# Patient Record
Sex: MALE | Race: BLACK OR AFRICAN AMERICAN | NOT HISPANIC OR LATINO | Employment: UNEMPLOYED | URBAN - METROPOLITAN AREA
[De-identification: names, ages, dates, MRNs, and addresses within clinical notes are randomized per-mention and may not be internally consistent; named-entity substitution may affect disease eponyms.]

---

## 2017-11-08 ENCOUNTER — HOSPITAL ENCOUNTER (EMERGENCY)
Age: 32
Discharge: HOME | End: 2017-11-08
Attending: EMERGENCY MEDICINE

## 2017-11-08 VITALS
HEIGHT: 75 IN | BODY MASS INDEX: 39.17 KG/M2 | WEIGHT: 315 LBS | OXYGEN SATURATION: 99 % | HEART RATE: 89 BPM | SYSTOLIC BLOOD PRESSURE: 134 MMHG | TEMPERATURE: 98.3 F | RESPIRATION RATE: 22 BRPM | DIASTOLIC BLOOD PRESSURE: 100 MMHG

## 2017-11-08 DIAGNOSIS — M54.50 CHRONIC RIGHT-SIDED LOW BACK PAIN WITHOUT SCIATICA: Primary | ICD-10-CM

## 2017-11-08 DIAGNOSIS — G89.29 CHRONIC RIGHT-SIDED LOW BACK PAIN WITHOUT SCIATICA: Primary | ICD-10-CM

## 2017-11-08 LAB
APPEARANCE UR: CLEAR
BILIRUB UR QL STRIP: NEGATIVE
COLOR UR: YELLOW
GLUCOSE UR STRIP-MCNC: NEGATIVE MG/DL
HGB UR QL STRIP: NEGATIVE
KETONES UR STRIP-MCNC: NEGATIVE MG/DL
LEUKOCYTE ESTERASE UR QL STRIP: NEGATIVE
NITRITE UR QL STRIP: NEGATIVE
PH UR STRIP: 5.5 UNITS (ref 5–7)
PROT UR STRIP-MCNC: 30 MG/DL
SP GR UR STRIP: >1.03 (ref 1–1.03)
UROBILINOGEN UR STRIP-MCNC: 0.2 MG/DL (ref 0–1)

## 2017-11-08 PROCEDURE — 99282 EMERGENCY DEPT VISIT SF MDM: CPT | Performed by: PHYSICIAN ASSISTANT

## 2017-11-08 PROCEDURE — 81003 URINALYSIS AUTO W/O SCOPE: CPT

## 2017-11-08 PROCEDURE — 99283 EMERGENCY DEPT VISIT LOW MDM: CPT

## 2017-11-08 RX ORDER — ACETAMINOPHEN 325 MG/1
650 TABLET ORAL ONCE
Status: DISCONTINUED | OUTPATIENT
Start: 2017-11-08 | End: 2017-11-08 | Stop reason: HOSPADM

## 2017-11-08 ASSESSMENT — PAIN SCALES - GENERAL
PAINLEVEL_OUTOF10: 10
PAINLEVEL_OUTOF10: 10

## 2019-10-07 ENCOUNTER — HOSPITAL ENCOUNTER (EMERGENCY)
Facility: CLINIC | Age: 34
Discharge: HOME | End: 2019-10-07
Attending: PHYSICIAN ASSISTANT
Payer: MEDICAID

## 2019-10-07 ENCOUNTER — HOSPITAL ENCOUNTER (EMERGENCY)
Facility: CLINIC | Age: 34
Discharge: HOME | End: 2019-10-07
Attending: EMERGENCY MEDICINE | Admitting: EMERGENCY MEDICINE
Payer: MEDICAID

## 2019-10-07 VITALS
TEMPERATURE: 97.1 F | SYSTOLIC BLOOD PRESSURE: 165 MMHG | OXYGEN SATURATION: 98 % | BODY MASS INDEX: 39.17 KG/M2 | WEIGHT: 315 LBS | RESPIRATION RATE: 20 BRPM | DIASTOLIC BLOOD PRESSURE: 104 MMHG | HEIGHT: 75 IN

## 2019-10-07 VITALS
HEART RATE: 100 BPM | RESPIRATION RATE: 18 BRPM | DIASTOLIC BLOOD PRESSURE: 108 MMHG | TEMPERATURE: 97.3 F | SYSTOLIC BLOOD PRESSURE: 142 MMHG | OXYGEN SATURATION: 96 %

## 2019-10-07 DIAGNOSIS — K08.89 PAIN, DENTAL: ICD-10-CM

## 2019-10-07 DIAGNOSIS — R68.84 JAW PAIN: ICD-10-CM

## 2019-10-07 PROCEDURE — 99282 EMERGENCY DEPT VISIT SF MDM: CPT | Mod: 27

## 2019-10-07 PROCEDURE — 25000132 ZZH RX MED GY IP 250 OP 250 PS 637: Performed by: PHYSICIAN ASSISTANT

## 2019-10-07 PROCEDURE — 99283 EMERGENCY DEPT VISIT LOW MDM: CPT

## 2019-10-07 RX ORDER — LIDOCAINE HYDROCHLORIDE AND EPINEPHRINE 10; 10 MG/ML; UG/ML
5 INJECTION, SOLUTION INFILTRATION; PERINEURAL ONCE
Status: DISCONTINUED | OUTPATIENT
Start: 2019-10-07 | End: 2019-10-07 | Stop reason: HOSPADM

## 2019-10-07 RX ORDER — IBUPROFEN 600 MG/1
600 TABLET, FILM COATED ORAL ONCE
Status: DISCONTINUED | OUTPATIENT
Start: 2019-10-07 | End: 2019-10-07 | Stop reason: HOSPADM

## 2019-10-07 RX ORDER — PENICILLIN V POTASSIUM 500 MG/1
500 TABLET, FILM COATED ORAL 4 TIMES DAILY
Qty: 28 TABLET | Refills: 0 | Status: SHIPPED | OUTPATIENT
Start: 2019-10-07

## 2019-10-07 RX ORDER — PENICILLIN V POTASSIUM 500 MG/1
500 TABLET, FILM COATED ORAL 4 TIMES DAILY
Qty: 28 TABLET | Refills: 0 | Status: SHIPPED | OUTPATIENT
Start: 2019-10-07 | End: 2019-10-07

## 2019-10-07 RX ORDER — IBUPROFEN 600 MG/1
600 TABLET, FILM COATED ORAL ONCE
Status: COMPLETED | OUTPATIENT
Start: 2019-10-07 | End: 2019-10-07

## 2019-10-07 RX ADMIN — IBUPROFEN 600 MG: 600 TABLET, FILM COATED ORAL at 21:40

## 2019-10-07 ASSESSMENT — ENCOUNTER SYMPTOMS
SHORTNESS OF BREATH: 0
NECK PAIN: 0
FACIAL SWELLING: 0
CHILLS: 0
FEVER: 0

## 2019-10-07 ASSESSMENT — MIFFLIN-ST. JEOR: SCORE: 2567.86

## 2019-10-07 NOTE — ED AVS SNAPSHOT
[facility] Emergency Department  [address]  [facility] [address]  Phone:  [phone]  Fax:  [phone]                                    Ted Terry   MRN: [id number]    Department:  [facility] Emergency Department   Date of Visit:  10/7/2019           After Visit Summary Signature Page    I have received my discharge instructions, and my questions have been answered. I have discussed any challenges I see with this plan with the nurse or doctor.    ..........................................................................................................................................  Patient/Patient Representative Signature      ..........................................................................................................................................  Patient Representative Print Name and Relationship to Patient    ..................................................               ................................................  Date                                   Time    ..........................................................................................................................................  Reviewed by Signature/Title    ...................................................              ..............................................  Date                                               Time          22EPIC Rev 08/18

## 2019-10-07 NOTE — ED PROVIDER NOTES
"  History     Chief Complaint:  Dental Pain    HPI:   The history is provided by the patient.      Ted Terry is a 33 year old male smoker who presents with dental pain. He developed left lower dental pain 4 days ago that has been gradually increasing. He feels he is \"cutting a new tooth\" and cannot remember if he has had wisdom teeth removal at this site. He reports it feels \"like pus in there.\" He denies fever, neck pain, or facial swelling. He took 800 mg of ibuprofen with minimal relief of symptoms.     Allergies:  No known drug allergies      Medications:    The patient is not currently taking any prescribed medications.     Past Medical History:    The patient does not have any past pertinent medical history.     Past Surgical History:    History reviewed. No pertinent surgical history.     Family History:    History reviewed. No pertinent family history.      Social History:  No PCP  Smoking status: current every day smoker    Review of Systems   Constitutional: Negative for chills and fever.   HENT: Positive for dental problem. Negative for facial swelling.    Musculoskeletal: Negative for neck pain.   All other systems reviewed and are negative.    Physical Exam     Patient Vitals for the past 24 hrs:   BP Temp Temp src Heart Rate Resp SpO2 Height Weight   10/07/19 1433 (!) 165/104 97.1  F (36.2  C) Oral 101 20 98 % 1.905 m (6' 3\") (!) 154.2 kg (340 lb)        Physical Exam  General: WD; obese; well appearing young  man; cooperative  Head:  Atraumatic  Eyes:  Conjunctivae, lids, and sclerae are normal  ENT:    Normal nose; MMM; tenderness to palpation over the gingiva posterior to the left lower third molar, somewhat fluctuant, without periapical abscess along the gingiva and no sublingual or submandibular edema; no external facial edema  Neck:  Supple; normal ROM  Resp:  No respiratory distress  GI:  Nondistended    MS:  Normal ROM  Skin:  Warm; non-diaphoretic; no pallor  Neuro: Awake; " "A&Ox3  Psych:  Normal mood and affect; normal speech  Vitals reviewed.     Emergency Department Course      Emergency Department Course:  Past medical records, nursing notes, and vitals reviewed.  1436: I performed an exam of the patient and obtained history, as documented above.      1440: Patient left the ED to return house keys to his mother and states he will be back.     Impression & Plan      Medical Decision Making:  Ted is a 33-year-old smoker who presents with left lower dental pain over the last 4 days without fever or other concerns.  He appears well on exam with tenderness primarily over the gingiva posterior to the left inferior third molar.  This is somewhat fluctuant and patient states it feels \"like pus in there.\"  I offered to do a dental block and an aspiration to evaluate for abscess although there is no obvious abscess here or periapically and no evidence of deep space infection or systemic signs of illness.  There is no PTA.  Patient was agreeable to this and when I left the room to get appropriate supplies, the patient then left the room stating he needed to get keys to his mother and would be \"right back.\"  At time of dictation, it has been greater than 1 hour and the patient has not returned to the emergency department.  Should he return, I would recommend a dental block with aspiration of questionable abscess and discharge on antibiotics.  However, this could not be completed as he eloped.    Diagnosis:    ICD-10-CM    1. Pain, dental K08.89        Disposition:  Eloped.    JACQUI Michelle Beh, am serving as a scribe at 2:36 PM on 10/7/2019 to document services personally performed by Carmela Aragon MD based on my observations and the provider's statements to me.      10/7/2019   [facility] EMERGENCY DEPARTMENT       Carmela Aragon MD  10/08/19 1047    "

## 2019-10-08 ASSESSMENT — ENCOUNTER SYMPTOMS
NECK PAIN: 1
TROUBLE SWALLOWING: 0

## 2019-10-08 NOTE — ED PROVIDER NOTES
"  History     Chief Complaint:  Dental Pain    HPI   Ted Terry is a 33 year old male who presents to the emergency department today for evaluation of dental pain. Per chart review, the patient was evaluated in the ED earlier today for multiple days of gradually worsening left lower molar pain. Plan was for dental block and aspiration, however the patient eloped prior to completion of these procedures as he \"needed to get keys to his mother.\"    Here the patient reports four days of pain to his posterior most left lower tooth described as \"like cutting a new tooth.\" He adds that today he began experiencing pain radiating down into his neck with associated swelling, increased saliva, abnormal taste, and difficulty swallowing. The patient denies any difficulty breathing or swallowing, but does note pain with swallowing. He has not seen a dentist in the last year. He denies facial or neck swelling or fevers.     Allergies:  No Known Drug Allergies    Medications:    Medications reviewed. No current medications.     Past Medical History:    Medical history reviewed. No pertinent medical history.    Past Surgical History:    Surgical history reviewed. No pertinent surgical history.    Family History:    Family history reviewed. No pertinent family history.     Social History:  The patient was accompanied to the ED by significant other and child.  Marital Status:  Single      Review of Systems   HENT: Positive for dental problem. Negative for trouble swallowing.         Increased saliva  Pain with swallowing   Respiratory: Negative for shortness of breath.    Musculoskeletal: Positive for neck pain.   All other systems reviewed and are negative.    Physical Exam     Patient Vitals for the past 24 hrs:   BP Temp Temp src Pulse Heart Rate Resp SpO2   10/07/19 2051 (!) 142/108 97.3  F (36.3  C) Oral 100 100 18 96 %     Physical Exam  General: Resting comfortably.  Alert and oriented.   Head:  The scalp, face, and head " appear normal   Eyes:  Conjunctivae and sclerae are normal    ENT:   Uvula is in the midline     Structures are symmetric.  No signs of retropharyngeal abscess or peritonsillar abscess.    There is swelling and mild fluctuance noted just posterior to the third molar of the left lower jaw.  No obvious periapical abscess.  No sublingual or submandibular swelling/edema.    Bilateral TMs are normal without evidence of infection.  Neck:  Left-sided anterior cervical lymphadenopathy.  No facial or neck swelling.  CV:  Regular rate and rhythm     Normal S1/S2  Resp:  Lungs are clear to auscultation    Non-labored    No rales or wheezing   MS:  Normal muscular tone   Skin:  No rash or acute skin lesions noted   Neuro: Speech is normal and fluent.       Emergency Department Course     Interventions:  2140 Ibuprofen 600 mg Oral    Emergency Department Course:    2111 Nursing notes and vitals reviewed.    2121 I performed an exam of the patient as documented above.     Findings and plan explained to the patient. Patient discharged home with instructions regarding supportive care, medications, and reasons to return. The importance of close follow-up was reviewed. The patient was prescribed penicillins.    Impression & Plan      Medical Decision Making:  Ted Terry is a 33 year old male who presents to the emergency department today for evaluation of dental pain. Please refer to the HPI for further details. The patient is well appearing with normal vitals.  There is no abscess detected around the tooth amenable to incision and drainage although there is an area or tenderness and minimal fluctuance, but no obvious abscess. The differential diagnosis includes: cracked tooth syndrome, pulpitis, sub-apical abscess, sinusitis, cellulitis amongst others.  There is no evidence of buccinator/canine space infections, significant facial swelling, or Rodney's angina.  No evidence to suggest other deep space infection such as  peritonsillar, retropharyngeal, or parapharyngeal abscess.    Dental block and attempted aspiration was offered, however patient declined and would like to utilize ibuprofen for management of pain.  Recommended tylenol and ibuprofen as needed for pain, and patient will be placed on course of antibiotics as below. I stressed the importance of following up with dentist as soon as possible for further workup and definitive management and patient given contact information for emergency and low-cost dental clinics.  Discussed indications to return to the ED if any fevers, vomiting, trouble breathing/swallowing, facial/neck swelling or any other concerns. All questions were answered prior to discharge. The patient understands and agrees to this plan.    Diagnosis:    ICD-10-CM    1. Jaw pain R68.84      Disposition:   The patient is discharged to home.    Discharge Medications:     Review of your medicines      START taking      Dose / Directions   penicillin V 500 MG tablet  Commonly known as:  VEETID      Dose:  500 mg  Take 1 tablet (500 mg) by mouth 4 times daily for 7 days  Quantity:  28 tablet  Refills:  0           Where to get your medicines      Some of these will need a paper prescription and others can be bought over the counter. Ask your nurse if you have questions.    Bring a paper prescription for each of these medications    penicillin V 500 MG tablet       Scribe Disclosure:  I, Ariana Wong, am serving as a scribe at 9:09 PM on 10/7/2019 to document services personally performed by Ivet Bird PA-C based on my observations and the provider's statements to me.    [facility] EMERGENCY DEPARTMENT       Ivet Bird PA-C  10/08/19 5954

## 2019-10-08 NOTE — DISCHARGE INSTRUCTIONS
Discharge Instructions  Dental Pain    You have been seen today for a toothache. Your pain may be caused by an exposed nerve, an infection (pulpitis), a root abscess (pocket of pus), or other problems. You will need to see a dentist for a solution to your tooth problem. Emergency Department care is only to help control your problem until you can see a dentist; we cannot provide complete dental care.  Today, we did not find any sign that your toothache was caused by any dangerous or life-threatening condition, but sometimes symptoms develop over time and cannot be found during an emergency visit, so it is very important that you follow up with your dentist.      Generally, every Emergency Department visit should have a follow-up clinic visit with either a primary or a specialty clinic/provider. Please follow-up as instructed by your emergency provider today.    Return to the Emergency Department if:  You develop a new fever over 100.4 F.  You cannot open your mouth normally, cannot move your tongue well, or cannot swallow.  You have new or increased swelling of your face or neck.  You develop drainage of pus or foul smelling material from around your tooth.  What can I do to help myself?  Take any antibiotic the provider may have prescribed for you today.  Avoid very hot or very cold foods as both can cause pain.  Make an appointment to see a dentist as soon as possible. Dentists are generally not  on-staff  at hospitals so we cannot  refer  to you to dentist but we may be able to provide a list of dental clinics to help you.  If you were given a prescription for medicine here today, be sure to read all of the information (including the package insert) that comes with your prescription.  This will include important information about the medicine, its side effects, and any warnings that you need to know about.  The pharmacist who fills the prescription can provide more information and answer questions you may have  about the medicine.  If you have questions or concerns that the pharmacist cannot address, please call or return to the Emergency Department.   Remember that you can always come back to the Emergency Department if you are not able to see your regular provider in the amount of time listed above, if you get any new symptoms, or if there is anything that worries you.      Discharge Instructions  Dental Pain    You have been seen today for a toothache. Your pain may be caused by an exposed nerve, an infection (pulpitis), a root abscess, or other problems. You will need to see a dentist for a solution to your tooth problem. Emergency Department care is only to help control your problem until you can see a dentist.  Today, we did not find any sign that your toothache was caused by a serious condition, but sometimes symptoms develop over time and cannot be found during an emergency visit, so it is very important that you follow up with your dentist.      Return to the Emergency Department if:  You develop a fever over 101 degrees Fahrenheit  You can t open your mouth normally, can t move your tongue well, or can t swallow  You have new or increased swelling of your face or neck.  You develop drainage of pus or foul smelling material from around your tooth.  What can I do to help myself?  Take any antibiotic the doctor may have prescribed for you today.  Avoid very hot or very cold foods as both can cause pain.  Make an appointment to see a dentist as soon as possible. If you wish, we can provide you with a list of low-cost dental clinics.       Remember that you can always come back to the Emergency Department if you are not able to see your regular doctor in the amount of time listed above, if you get any new symptoms, or if there is anything that worries you.        Dental Resources  Name/Address/Phone Eligibility Hours Fee   [facility]  [address]  [place], [place] [id number] [phone]  Anyone Call for appointment [facility] Emergency Department  Medical Assistance  Private Insurance   [facility]  [address] [place] [address]  [phone] Anyone Call for appointment    [place] refers to low-cost dental clinics for non-preventive care    Bel[place]an Interpreters available Prices start at   Adults        Cleaning $36-$160        X-Ray $20-40  Children        Cleaning $15        X-ray $10-20        Fluoride $10  Accepts cash, check or credit;  Does not take insurance or MA.   [facility]  [address] [place], [place]  [phone] Anyone Afternoons and evenings    September-May    Answers phones after 10 AM $30.00 per visit   ($15.00 per visit if 62 or older)   Preventive care.  Restoration care; sliding fee; MA   [facility]  [address] [place] [address]  [phone] Children birth to age 18 and pregnant women    [facility] Residents without insurance will be asked to apply for Assured Care. M TH F 8:30 am - 5 pm  T W 8:30 am - 7 pm    30 locations metro wide    Call for appointment and to confirm hours. Sliding Fee  [facility] Emergency Department  Medical Assistance  Assured Access  Private Insurance    8 Languages Spoken   [facility] [address] [address]  [phone] Anyone Call for appointment Sliding Fee  Accept insurance, [place],   [place] and self-pay.  Call if no insurance.    All services provided.  Staff fluent in Hmong, Laotian, Cymro, French, Lao, Belizean, and Farsi.   [facility]  [address] [place] [address]  [phone] Children  Adults in a walk in basis Mon - Fri. 8 - 5 pm       (closed 1-2 pm)  General Dentists & Hygienists  Private Dentists  Dentures Fees based on family size and income ranging from 40% - 100% payment by patient.   [facility]  [address] [place]  [address]    [phone]  Anyone Mon - Fri 7:30 am - 5:00 pm  By Appt.    Tues & Wed @ 3:00 call for urgent care Appt for next day service Sliding fee:  MA; Insurance   [facility]  [address] [place]  [address]  [phone] Anyone Call for an appointment.  Days open vary every semester. Adult cleaning $25  Child cleaning $15  X-Rays $10-$15  Whitening services available  $75, includes cleaning  Seniors 50% off   [facility]  [address] [place], [place]  [phone] Anyone M-F 8 am - 5 pm Most insurances accepted.  MA and Sliding Scale.   [facility]  [address] [place] [address]  [phone] Anyone without insurance Call to make appt   M-F 9:00 am - 5 pm   (Closed noon hour 12-1)    6 pm- 8 pm Evening hours also available for care Sliding fee based on income and size of household.   [facility]  [address] [place] [address]  [address]  [phone] press option 1    For the [facility] press option 4 Anyone              Anyone Monday  4 - 6:30 pm  Tuesday 12:30 - 3 & 4-6:30  Thursday 8:30 - 11 am & 12-2:30 pm  September through May only    All year around on Thursdays from 5-9 pm (only time a dentist is in.) Cleanings & X-Rays Only  Cleanings:  Adults $30                   Kids $20  X-Rays:  Adults $34                Kids $10    MA and Sliding fee   [facility]  [address] [place] [address]    [phone] Anyone    ([employer] interpreters available) M-F 8:00 am - 5:00 pm       By appointment only  (same day appointments available) Sliding fee ($40+ may be due at appointment, remainder billed); MA; Insurance   [facility]  [address] [place], [place] [phone]   Anyone    ([employer] interpreters available) M-Th 8:00 am - 8:00 pm  F 8:00 am - 5:00 pm    By appointment only  (same day appointments available)  Sliding fee ($40+ may be due at appointment, remainder billed); MA; Insurance   [facility]  [address] [place], [place]  [phone] Anyone    Must live within [facility] to qualify for sliding fee services Mon, Tues, Thurs, Fri  8:30 am - 5:00 pm  Wed. 8:30 am - 7:00 pm  All other services by appt. only Sliding Fee; MA   Offer payment plans    Have financial workers that will assist with [place] and will use sliding fee for those who do not qualify.      [facility]  [address] [place] [phone] Anyone without insurance     Hours and day of week vary  (Call ahead for hours)    Walk-in only Free Services    Cleanings; Fillings; Extractions   [facility]  [address] [place] [address]  [phone] Anyone Call for an appointment Accept patients with [place] and Medical Assistance.  10% discount if bill is paid in full on day of service.  No sliding fee scale.     [facility]  [address] [place], [place] [phone] Anyone M-F  8 am-5 pm  Call for appt.    Walk-in hours 8 am - 11am and 1 pm - 5 pm, however take scheduled appointments first    No emergency services or oral surgeries Sliding Fee available with an [place] or [place] denial letter and proof of income.    Accepts Assured Access card and MA coverage.     Name/Address/Phone Eligibility Hours Fee   [facility]  [address] [place]  [address] [phone] Anyone with emergencies only M & W clinic begins at 6 pm   Call ahead    Alternate Fridays for children by Appt only Free   [facility]  [address] [place] [address] [phone]    Emergencies (adults only):  [phone] Anyone Free walk-in screens for oral surgery    Call ahead for hours    All other services by appt. only  Accepts MA for pediatrics only    Rates are about 25% -  40% less than private dental office.    No sliding fee scale   [facility]  [facility] [place]  [address]  [phone] Anyone as long as they do not have health insurance Hours on Mondays, Tuesdays, and Thursdays Sliding fees based on monthly income    No root canals, tooth pulls or emergencies   [facility]  [address] [place] [address]  [phone] Anyone  M - F 8:00 am - 5:00 pm  Wed 8:00 am - 8 pm Sliding fees; MA; Insurance   [facility]  [address] [place] [address]  [address]  [phone] Anyone age 55+ M - F 8:00 am - 4:30 pm    Appt. only Set slightly lower fees;  MA; Insurance         Give Kids a smile day in [facility] Children Takes place in February at a few locations

## 2019-11-13 ENCOUNTER — HOSPITAL ENCOUNTER (EMERGENCY)
Facility: CLINIC | Age: 34
Discharge: HOME | End: 2019-11-13
Attending: EMERGENCY MEDICINE | Admitting: EMERGENCY MEDICINE
Payer: MEDICAID

## 2019-11-13 VITALS
WEIGHT: 315 LBS | HEART RATE: 83 BPM | OXYGEN SATURATION: 99 % | DIASTOLIC BLOOD PRESSURE: 99 MMHG | RESPIRATION RATE: 24 BRPM | BODY MASS INDEX: 41.75 KG/M2 | HEIGHT: 73 IN | SYSTOLIC BLOOD PRESSURE: 149 MMHG | TEMPERATURE: 96.9 F

## 2019-11-13 DIAGNOSIS — E11.69 TYPE 2 DIABETES MELLITUS WITH OTHER SPECIFIED COMPLICATION, WITHOUT LONG-TERM CURRENT USE OF INSULIN (H): ICD-10-CM

## 2019-11-13 DIAGNOSIS — R73.9 HYPERGLYCEMIA: ICD-10-CM

## 2019-11-13 LAB
ALBUMIN SERPL-MCNC: 3.7 G/DL (ref 3.4–5)
ALP SERPL-CCNC: 91 U/L (ref 40–150)
ALT SERPL W P-5'-P-CCNC: 51 U/L (ref 0–70)
ANION GAP SERPL CALCULATED.3IONS-SCNC: 8 MMOL/L (ref 3–14)
AST SERPL W P-5'-P-CCNC: 27 U/L (ref 0–45)
BASOPHILS # BLD AUTO: 0.1 10E9/L (ref 0–0.2)
BASOPHILS NFR BLD AUTO: 0.7 %
BILIRUB SERPL-MCNC: 0.3 MG/DL (ref 0.2–1.3)
BUN SERPL-MCNC: 9 MG/DL (ref 7–30)
CALCIUM SERPL-MCNC: 9.5 MG/DL (ref 8.5–10.1)
CHLORIDE SERPL-SCNC: 100 MMOL/L (ref 94–109)
CO2 SERPL-SCNC: 26 MMOL/L (ref 20–32)
CREAT SERPL-MCNC: 0.83 MG/DL (ref 0.66–1.25)
DIFFERENTIAL METHOD BLD: NORMAL
EOSINOPHIL # BLD AUTO: 0.3 10E9/L (ref 0–0.7)
EOSINOPHIL NFR BLD AUTO: 3.1 %
ERYTHROCYTE [DISTWIDTH] IN BLOOD BY AUTOMATED COUNT: 12.6 % (ref 10–15)
GFR SERPL CREATININE-BSD FRML MDRD: >90 ML/MIN/{1.73_M2}
GLUCOSE BLDC GLUCOMTR-MCNC: 373 MG/DL (ref 70–99)
GLUCOSE BLDC GLUCOMTR-MCNC: 386 MG/DL (ref 70–99)
GLUCOSE SERPL-MCNC: 399 MG/DL (ref 70–99)
HBA1C MFR BLD: 11.5 % (ref 0–5.6)
HCT VFR BLD AUTO: 46.7 % (ref 40–53)
HGB BLD-MCNC: 15.8 G/DL (ref 13.3–17.7)
IMM GRANULOCYTES # BLD: 0 10E9/L (ref 0–0.4)
IMM GRANULOCYTES NFR BLD: 0.2 %
KETONES BLD-SCNC: 0.2 MMOL/L (ref 0–0.6)
LYMPHOCYTES # BLD AUTO: 3.9 10E9/L (ref 0.8–5.3)
LYMPHOCYTES NFR BLD AUTO: 42.8 %
MCH RBC QN AUTO: 31 PG (ref 26.5–33)
MCHC RBC AUTO-ENTMCNC: 33.8 G/DL (ref 31.5–36.5)
MCV RBC AUTO: 92 FL (ref 78–100)
MONOCYTES # BLD AUTO: 0.7 10E9/L (ref 0–1.3)
MONOCYTES NFR BLD AUTO: 8.2 %
NEUTROPHILS # BLD AUTO: 4.1 10E9/L (ref 1.6–8.3)
NEUTROPHILS NFR BLD AUTO: 45 %
NRBC # BLD AUTO: 0 10*3/UL
NRBC BLD AUTO-RTO: 0 /100
PLATELET # BLD AUTO: 352 10E9/L (ref 150–450)
POTASSIUM SERPL-SCNC: 4.2 MMOL/L (ref 3.4–5.3)
PROT SERPL-MCNC: 7.5 G/DL (ref 6.8–8.8)
RBC # BLD AUTO: 5.1 10E12/L (ref 4.4–5.9)
SODIUM SERPL-SCNC: 134 MMOL/L (ref 133–144)
WBC # BLD AUTO: 9 10E9/L (ref 4–11)

## 2019-11-13 PROCEDURE — 25800030 ZZH RX IP 258 OP 636: Performed by: EMERGENCY MEDICINE

## 2019-11-13 PROCEDURE — 99283 EMERGENCY DEPT VISIT LOW MDM: CPT | Mod: 25

## 2019-11-13 PROCEDURE — 96360 HYDRATION IV INFUSION INIT: CPT

## 2019-11-13 PROCEDURE — 85025 COMPLETE CBC W/AUTO DIFF WBC: CPT | Performed by: EMERGENCY MEDICINE

## 2019-11-13 PROCEDURE — 80053 COMPREHEN METABOLIC PANEL: CPT | Performed by: EMERGENCY MEDICINE

## 2019-11-13 PROCEDURE — 82010 KETONE BODYS QUAN: CPT | Performed by: EMERGENCY MEDICINE

## 2019-11-13 PROCEDURE — 83036 HEMOGLOBIN GLYCOSYLATED A1C: CPT | Performed by: EMERGENCY MEDICINE

## 2019-11-13 PROCEDURE — 00000146 ZZHCL STATISTIC GLUCOSE BY METER IP

## 2019-11-13 RX ORDER — BLOOD-GLUCOSE CONTROL, NORMAL
EACH MISCELLANEOUS
Qty: 1 BOTTLE | Refills: 3 | Status: SHIPPED | OUTPATIENT
Start: 2019-11-13

## 2019-11-13 RX ORDER — LANCETS
EACH MISCELLANEOUS
Qty: 102 EACH | Refills: 6 | Status: SHIPPED | OUTPATIENT
Start: 2019-11-13

## 2019-11-13 RX ORDER — GLUCOSAMINE HCL/CHONDROITIN SU 500-400 MG
CAPSULE ORAL
Qty: 100 EACH | Refills: 3 | Status: SHIPPED | OUTPATIENT
Start: 2019-11-13

## 2019-11-13 RX ADMIN — SODIUM CHLORIDE 1000 ML: 9 INJECTION, SOLUTION INTRAVENOUS at 20:12

## 2019-11-13 ASSESSMENT — ENCOUNTER SYMPTOMS
ABDOMINAL PAIN: 0
SHORTNESS OF BREATH: 0
NAUSEA: 0
FEVER: 0
VOMITING: 0

## 2019-11-13 ASSESSMENT — MIFFLIN-ST. JEOR: SCORE: 2438.88

## 2019-11-13 NOTE — ED AVS SNAPSHOT
[facility] Emergency Department  [address]  [facility] [address]  Phone:  [phone]  Fax:  [phone]                                    Ted Terry   MRN: [id number]    Department:  [facility] Emergency Department   Date of Visit:  11/13/2019           After Visit Summary Signature Page    I have received my discharge instructions, and my questions have been answered. I have discussed any challenges I see with this plan with the nurse or doctor.    ..........................................................................................................................................  Patient/Patient Representative Signature      ..........................................................................................................................................  Patient Representative Print Name and Relationship to Patient    ..................................................               ................................................  Date                                   Time    ..........................................................................................................................................  Reviewed by Signature/Title    ...................................................              ..............................................  Date                                               Time          22EPIC Rev 08/18

## 2019-11-14 NOTE — DISCHARGE INSTRUCTIONS
You were diagnosed with type 2 diabetes tonight.  Plan to continue taking the metformin as prescribed.  Make an appointment with a primary care doctor in the next 1 week for close outpatient follow-up and discussion of medical treatment for type 2 diabetes.    Return to the emergency department if you develop fever, vomiting, or new weakness symptoms.

## 2019-11-14 NOTE — ED TRIAGE NOTES
Pt has recent diagnosis of diabetes and started on metformin and insulin.  He now feels very thirsty with frequent urination.  His mother checked glucose which was in 400s.

## 2019-11-14 NOTE — ED PROVIDER NOTES
"History     Chief Complaint:  Hyperglycemia    HPI  Ted Terry is a 34 year old male recently diagnosed with diabetes who presents to the emergency department today for evaluation of hyperglycemia. The patient recently spent time in the [facility] and while there was found to be hyperglycemic and given insulin and Metformin. This was a new diagnosis of diabetes for the patient and he has no outpatient prescriptions of Metformin or insulin. The patient denies any fever, chest pain, shortness of breath, abdominal pain, nausea, or vomiting. He does report drinking frequently and his last drink was 9 days ago.  He does not currently have a primary care physician.      Allergies:  No known drug allergies    Medications:    Penicillin    Past Medical History:    Tobacco use    Past Surgical History:    History reviewed. No pertinent surgical history.    Family History:    History reviewed. No pertinent family history.     Social History:  The patient arrives with girlfriend  Drinks frequently  Marital Status: Single [1]      Review of Systems   Constitutional: Negative for fever.   Respiratory: Negative for shortness of breath.    Cardiovascular: Negative for chest pain.   Gastrointestinal: Negative for abdominal pain, nausea and vomiting.   All other systems reviewed and are negative.    Physical Exam     Patient Vitals for the past 24 hrs:   BP Temp Temp src Pulse Heart Rate Resp SpO2 Height Weight   11/13/19 2010 -- -- -- -- -- -- 99 % -- --   11/13/19 2005 -- -- -- -- -- -- 99 % -- --   11/13/19 2000 -- -- -- -- -- -- 99 % -- --   11/13/19 1955 -- -- -- -- -- -- 100 % -- --   11/13/19 1950 -- -- -- -- -- -- 98 % -- --   11/13/19 1945 (!) 149/99 -- -- 83 -- -- -- -- --   11/13/19 1903 (!) 149/113 96.9  F (36.1  C) Temporal -- 93 24 98 % 1.854 m (6' 1\") 145 kg (319 lb 10.7 oz)     Physical Exam  General: Alert, appears well-developed and well-nourished. Cooperative.     In mild " distress  HEENT:  Head:  Atraumatic  Ears:  External ears are normal  Mouth/Throat:  Oropharynx is without erythema or exudate and mucous membranes are moist.   Eyes:   Conjunctivae normal and EOM are normal. No scleral icterus.    Pupils are equal, round, and reactive to light.   Neck:   Normal range of motion. Neck supple.  CV:  Normal rate, regular rhythm, normal heart sounds and radial pulses are 2+ and symmetric.  No murmur.  Resp:  Breath sounds are clear bilaterally    Non-labored, no retractions or accessory muscle use  GI:  Obese. Abdomen is soft, no distension, no tenderness. No rebound or guarding.  No CVA tenderness bilaterally  MS:  Normal range of motion. No edema.    Normal strength in all 4 extremities.     Back atraumatic.    No midline cervical, thoracic, or lumbar tenderness  Skin:  Warm and dry.  No rash or lesions noted.  Neuro: Alert. Normal strength.  GCS: 15  Psych:  Normal mood and affect.    Emergency Department Course   Laboratory:  Laboratory findings were communicated with the patient who voiced understanding of the findings.    CBC:  o/w WNL. (WBC 9.0, HGB 15.8, )   CMP: Glucose 399 (H), o/w WNL (Creatinine: 0.83)    Glucose by meter (Collected 1940): 373 (H)   Glucose by meter (Collected 1908): 386 ((H)     Ketone beta 0.2  Hemoglobin A1c 11.5 (H)    Interventions:  2012 NS 1L IV Bolus    Emergency Department Course:  Past medical records, nursing notes, and vitals reviewed.  1950: I performed an exam of the patient and obtained history, as documented above.     IV was inserted and blood was drawn for laboratory testing, results above.    I personally reviewed the laboratory/imaging results with the Patient and answered all related questions prior to discharge.    2048: I rechecked the patient.  Findings and plan explained to the Patient. Patient discharged home with instructions regarding supportive care, medications, and reasons to return. The importance of close follow-up was  reviewed.     Impression & Plan        Medical Decision Making:  Ted Terry is a 34-year-old male who presents after recently being incarcerated with concerns for high blood sugar.  Patient notes he was recently started on metformin and insulin medications when he was incarcerated but does not have any medications for home use.  It does appear the patient is newly diabetic.  I do believe this is type 2 diabetes as patient has no presentation of acidosis and no prior history of diabetes prior to his recent incarceration.  He does not have a PCP.  Patient's hemoglobin A1c is 11.5 here.  He is poorly controlled and ultimately we will plan to start a course of metformin and establish care with a PCP within the next one week.   He may ultimately require additional therapies or increase/modifications to the metformin medication.  Renal function appears normal, CBC unremarkable, patient is symptomatically improved after IV fluids here.  No indication for initiation of insulin therapies today, but patient understands the importance of close outpatient follow-up. Glucometer and all supplies prescribed for the patient as well.  All questions answered prior to discharge.  Discharged home.    Diagnosis:    ICD-10-CM    1. Type 2 diabetes mellitus with other specified complication, without long-term current use of insulin (H) E11.69 CBC with platelets differential     Comprehensive metabolic panel     Ketone Beta-Hydroxybutyrate Quantitative     Hemoglobin A1c   2. Hyperglycemia R73.9        Disposition:   discharged to home    Discharge Medications:     Medication List      Started    alcohol swab prep pads  Use to swab area of injection/patsy as directed.     blood glucose calibration solution  Use to calibrate blood glucose monitor as directed.     blood glucose monitoring lancets  Use to test blood sugar 1 time daily or as directed.     blood glucose monitoring meter device kit  Use to test blood sugar 1 time daily.      blood glucose test strip  Commonly known as:  ACCU-CHEK SMARTVIEW  Use to test blood sugar 1 time daily.     metFORMIN 850 MG tablet  Commonly known as:  GLUCOPHAGE  850 mg, Oral, DAILY WITH SUPPER            Scribe Disclosure:  I, Angelica Godfrey, am serving as a scribe at 8:01 PM on 11/13/2019 to document services personally performed by Philippe Viveros MD based on my observations and the provider's statements to me.    [facility] EMERGENCY DEPARTMENT       Philippe Viveros MD  11/14/19 0129